# Patient Record
Sex: FEMALE | Race: WHITE
[De-identification: names, ages, dates, MRNs, and addresses within clinical notes are randomized per-mention and may not be internally consistent; named-entity substitution may affect disease eponyms.]

---

## 2020-07-05 ENCOUNTER — HOSPITAL ENCOUNTER (EMERGENCY)
Dept: HOSPITAL 54 - ER | Age: 35
LOS: 1 days | Discharge: HOME | End: 2020-07-06
Payer: COMMERCIAL

## 2020-07-05 VITALS — WEIGHT: 130 LBS | HEIGHT: 63 IN | BODY MASS INDEX: 23.04 KG/M2

## 2020-07-05 DIAGNOSIS — R19.7: ICD-10-CM

## 2020-07-05 DIAGNOSIS — R10.30: Primary | ICD-10-CM

## 2020-07-05 PROCEDURE — 80048 BASIC METABOLIC PNL TOTAL CA: CPT

## 2020-07-05 PROCEDURE — 81001 URINALYSIS AUTO W/SCOPE: CPT

## 2020-07-05 PROCEDURE — 96375 TX/PRO/DX INJ NEW DRUG ADDON: CPT

## 2020-07-05 PROCEDURE — 85025 COMPLETE CBC W/AUTO DIFF WBC: CPT

## 2020-07-05 PROCEDURE — 74176 CT ABD & PELVIS W/O CONTRAST: CPT

## 2020-07-05 PROCEDURE — 36415 COLL VENOUS BLD VENIPUNCTURE: CPT

## 2020-07-05 PROCEDURE — 99285 EMERGENCY DEPT VISIT HI MDM: CPT

## 2020-07-05 PROCEDURE — 96374 THER/PROPH/DIAG INJ IV PUSH: CPT

## 2020-07-05 PROCEDURE — 96361 HYDRATE IV INFUSION ADD-ON: CPT

## 2020-07-05 PROCEDURE — 84703 CHORIONIC GONADOTROPIN ASSAY: CPT

## 2020-07-05 PROCEDURE — 96376 TX/PRO/DX INJ SAME DRUG ADON: CPT

## 2020-07-05 PROCEDURE — 80076 HEPATIC FUNCTION PANEL: CPT

## 2020-07-05 NOTE — NUR
PT PRESENTED TO THE ER WITH A C/O LOWER/SUPRAPUBIC ABD PAIN (7/10) THAT 
RADIATES TO A SHARP INTERMITTENT PAIN IN THE EPIGASTRIC AREA. PT WAS ABLE TO 
GIVE A URINE SAMPLE AND AMBULATED TO ER 2 WITH A STEADY GAIT, BUT GUARDING HER 
ABD. PT DENIES PAIN WITH PALPATION. PT STATED THAT SHE FELT BLOATED AROUND 10AM 
AND WAS HAVING ABD PAIN WITH DIARRHEA AROUND 1300. PT STATED THAT SHE HAS BEEN 
HAVING ON AND OFF CONSTIPATION AND DIARRHEA SINCE 1300. PT WAS ABLE TO EAT A 
BAGEL THIS MORNING THIS MORNING AND HAD DISCOMFORT AFTER EATING TRI TIP AND 
POTATOES THIS AFTERNOON. PT HAS HAD AN EPISODE OF VOMITTING IN THE AFTERNOON. 
PT WAS PLACED ON THE MONITOR AND CONTINUOUS POX. RESP ARE EVEN AND UNLABORED.

## 2020-07-06 VITALS — SYSTOLIC BLOOD PRESSURE: 101 MMHG | DIASTOLIC BLOOD PRESSURE: 63 MMHG

## 2020-07-06 LAB
ALBUMIN SERPL BCP-MCNC: 3.3 G/DL (ref 3.4–5)
ALP SERPL-CCNC: 50 U/L (ref 46–116)
ALT SERPL W P-5'-P-CCNC: 30 U/L (ref 12–78)
APPEARANCE UR: CLEAR
AST SERPL W P-5'-P-CCNC: 16 U/L (ref 15–37)
BASOPHILS # BLD AUTO: 0 /CMM (ref 0–0.2)
BASOPHILS NFR BLD AUTO: 0.2 % (ref 0–2)
BILIRUB DIRECT SERPL-MCNC: 0.1 MG/DL (ref 0–0.2)
BILIRUB SERPL-MCNC: 0.6 MG/DL (ref 0.2–1)
BILIRUB UR QL STRIP: NEGATIVE
BUN SERPL-MCNC: 13 MG/DL (ref 7–18)
CALCIUM SERPL-MCNC: 8.7 MG/DL (ref 8.5–10.1)
CHLORIDE SERPL-SCNC: 102 MMOL/L (ref 98–107)
CO2 SERPL-SCNC: 28 MMOL/L (ref 21–32)
COLOR UR: YELLOW
CREAT SERPL-MCNC: 0.9 MG/DL (ref 0.6–1.3)
EOSINOPHIL NFR BLD AUTO: 0.7 % (ref 0–6)
GLUCOSE SERPL-MCNC: 116 MG/DL (ref 74–106)
GLUCOSE UR STRIP-MCNC: NEGATIVE MG/DL
HCT VFR BLD AUTO: 44 % (ref 33–45)
HGB BLD-MCNC: 14.4 G/DL (ref 11.5–14.8)
HGB UR QL STRIP: NEGATIVE ERY/UL
KETONES UR STRIP-MCNC: NEGATIVE MG/DL
LEUKOCYTE ESTERASE UR QL STRIP: (no result)
LYMPHOCYTES NFR BLD AUTO: 0.7 /CMM (ref 0.8–4.8)
LYMPHOCYTES NFR BLD AUTO: 10.6 % (ref 20–44)
MCHC RBC AUTO-ENTMCNC: 33 G/DL (ref 31–36)
MCV RBC AUTO: 95 FL (ref 82–100)
MONOCYTES NFR BLD AUTO: 0.3 /CMM (ref 0.1–1.3)
MONOCYTES NFR BLD AUTO: 5.2 % (ref 2–12)
NEUTROPHILS # BLD AUTO: 5.6 /CMM (ref 1.8–8.9)
NEUTROPHILS NFR BLD AUTO: 83.3 % (ref 43–81)
NITRITE UR QL STRIP: NEGATIVE
PH UR STRIP: 7.5 [PH] (ref 5–8)
PLATELET # BLD AUTO: 184 /CMM (ref 150–450)
POTASSIUM SERPL-SCNC: 3.4 MMOL/L (ref 3.5–5.1)
PROT SERPL-MCNC: 6.7 G/DL (ref 6.4–8.2)
PROT UR QL STRIP: NEGATIVE MG/DL
RBC # BLD AUTO: 4.59 MIL/UL (ref 4–5.2)
RBC #/AREA URNS HPF: (no result) /HPF (ref 0–2)
SODIUM SERPL-SCNC: 137 MMOL/L (ref 136–145)
UROBILINOGEN UR STRIP-MCNC: 0.2 EU/DL
WBC #/AREA URNS HPF: (no result) /HPF (ref 0–3)
WBC NRBC COR # BLD AUTO: 6.8 K/UL (ref 4.3–11)

## 2020-07-06 NOTE — NUR
PT IS MEDICALLY STABLE FOR D/C. IV removed. Catheter intact and site benign. 
Pressure and 4x4 applied to site. No bleeding noted.Patient discharged to home 
in stable condition. Rx and Written and verbal after care instructions given. 
Patient verbalizes understanding of instruction. Pt walked out w/ steady gaits 
and [place] provided the ride back home.

## 2020-08-17 ENCOUNTER — OFFICE (OUTPATIENT)
Dept: URBAN - METROPOLITAN AREA CLINIC 67 | Facility: CLINIC | Age: 35
End: 2020-08-17

## 2020-08-17 VITALS
TEMPERATURE: 96.1 F | DIASTOLIC BLOOD PRESSURE: 60 MMHG | HEIGHT: 62 IN | WEIGHT: 130 LBS | SYSTOLIC BLOOD PRESSURE: 120 MMHG

## 2020-08-17 DIAGNOSIS — R19.7 ACUTE DIARRHEA: ICD-10-CM

## 2020-08-17 PROCEDURE — 99203 OFFICE O/P NEW LOW 30 MIN: CPT | Performed by: INTERNAL MEDICINE

## 2020-08-17 NOTE — SERVICEHPINOTES
The patient is a sukumar 34-year-old female referred for evaluation of acute diarrhea and abdominal pain. The patient has remained in excellent health, on July 5 she came down with nausea and vomiting in the morning, which then led to large volume watery diarrhea through the afternoon and evening into the next day. It took a day or 2 to settle down, and on the night of the fifth she did go to the emergency room and had negative blood testing and a CT scan which showed some inflammatory changes in the intestine. The patient resolved her symptoms and she has been well subsequently. She had a similar episode 7 years ago that resolved. She is well in between. She has no family history of inflammatory bowel disease. She is here now to discuss further evaluation.

## 2023-01-10 ENCOUNTER — OFFICE (OUTPATIENT)
Dept: URBAN - METROPOLITAN AREA CLINIC 66 | Facility: CLINIC | Age: 38
End: 2023-01-10

## 2023-01-10 VITALS — HEIGHT: 62 IN | WEIGHT: 144 LBS | DIASTOLIC BLOOD PRESSURE: 71 MMHG | SYSTOLIC BLOOD PRESSURE: 118 MMHG

## 2023-01-10 DIAGNOSIS — R19.7 ACUTE DIARRHEA: ICD-10-CM

## 2023-01-10 PROCEDURE — 99213 OFFICE O/P EST LOW 20 MIN: CPT | Performed by: INTERNAL MEDICINE

## 2023-01-10 NOTE — SERVICEHPINOTES
The patient is a sukumar 37-year-old female referred for evaluation of recurrent acute abdominal pain, diarrhea and N/V. The patient has remained in excellent health. In July 2020 she came down with nausea and vomiting in the morning, which then led to large volume watery diarrhea through the afternoon and evening into the next day. It took a day or 2 to settle down, and on the night of the fifth she did go to the emergency room and had negative blood testing and a CT scan which showed some inflammatory changes in the intestine. The patient resolved her symptoms but subsequently has a similar attack every 6-12 months--the latest in September and October of 2022. She had a similar episodes, though very minor, 10 years ago that resolved. She is well in between. She has no family history of inflammatory bowel disease. She is here now to discuss further evaluation.

## 2023-02-06 ENCOUNTER — AMBULATORY SURGICAL CENTER (OUTPATIENT)
Dept: URBAN - METROPOLITAN AREA SURGERY 42 | Facility: SURGERY | Age: 38
End: 2023-02-06

## 2023-02-06 VITALS
HEART RATE: 70 BPM | DIASTOLIC BLOOD PRESSURE: 65 MMHG | RESPIRATION RATE: 16 BRPM | SYSTOLIC BLOOD PRESSURE: 108 MMHG | TEMPERATURE: 97.6 F | HEIGHT: 62 IN | OXYGEN SATURATION: 100 %

## 2023-02-06 DIAGNOSIS — R10.13 EPIGASTRIC PAIN: ICD-10-CM

## 2023-02-06 DIAGNOSIS — R19.8 OTHER SPECIFIED SYMPTOMS AND SIGNS INVOLVING THE DIGESTIVE S: ICD-10-CM

## 2023-02-06 DIAGNOSIS — K63.5 POLYP OF COLON: ICD-10-CM

## 2023-02-06 PROBLEM — K20.80 OTHER ESOPHAGITIS WITHOUT BLEEDING: Status: ACTIVE | Noted: 2023-02-06

## 2023-02-06 PROBLEM — K31.89 OTHER DISEASES OF STOMACH AND DUODENUM: Status: ACTIVE | Noted: 2023-02-06

## 2023-02-06 PROBLEM — K21.9 GERD: Status: ACTIVE | Noted: 2023-02-06

## 2023-02-06 LAB — SURGICAL: PDF REPORT: (no result)

## 2023-02-06 PROCEDURE — 45380 COLONOSCOPY AND BIOPSY: CPT | Performed by: INTERNAL MEDICINE

## 2023-02-06 PROCEDURE — 99153 MOD SED SAME PHYS/QHP EA: CPT | Performed by: INTERNAL MEDICINE

## 2023-02-06 PROCEDURE — 99152 MOD SED SAME PHYS/QHP 5/>YRS: CPT | Performed by: INTERNAL MEDICINE

## 2023-02-06 PROCEDURE — 43239 EGD BIOPSY SINGLE/MULTIPLE: CPT | Performed by: INTERNAL MEDICINE

## 2023-03-29 PROBLEM — R10.13 EPIGASTRIC PAIN: Status: ACTIVE | Noted: 2023-02-06
